# Patient Record
Sex: FEMALE | Race: WHITE | NOT HISPANIC OR LATINO | URBAN - METROPOLITAN AREA
[De-identification: names, ages, dates, MRNs, and addresses within clinical notes are randomized per-mention and may not be internally consistent; named-entity substitution may affect disease eponyms.]

---

## 2017-06-20 ENCOUNTER — OUTPATIENT (OUTPATIENT)
Dept: OUTPATIENT SERVICES | Facility: HOSPITAL | Age: 82
LOS: 1 days | Discharge: HOME | End: 2017-06-20

## 2017-06-20 DIAGNOSIS — R10.9 UNSPECIFIED ABDOMINAL PAIN: ICD-10-CM
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
## 2017-06-28 DIAGNOSIS — I48.2 CHRONIC ATRIAL FIBRILLATION: ICD-10-CM

## 2017-07-05 ENCOUNTER — OUTPATIENT (OUTPATIENT)
Dept: OUTPATIENT SERVICES | Facility: HOSPITAL | Age: 82
LOS: 1 days | Discharge: HOME | End: 2017-07-05
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
## 2017-07-05 DIAGNOSIS — R10.9 UNSPECIFIED ABDOMINAL PAIN: ICD-10-CM

## 2017-07-05 DIAGNOSIS — I48.2 CHRONIC ATRIAL FIBRILLATION: ICD-10-CM

## 2017-07-18 ENCOUNTER — OUTPATIENT (OUTPATIENT)
Dept: OUTPATIENT SERVICES | Facility: HOSPITAL | Age: 82
LOS: 1 days | Discharge: HOME | End: 2017-07-18
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
## 2017-07-18 DIAGNOSIS — I48.2 CHRONIC ATRIAL FIBRILLATION: ICD-10-CM

## 2017-07-18 DIAGNOSIS — R10.9 UNSPECIFIED ABDOMINAL PAIN: ICD-10-CM

## 2017-08-01 ENCOUNTER — OUTPATIENT (OUTPATIENT)
Dept: OUTPATIENT SERVICES | Facility: HOSPITAL | Age: 82
LOS: 1 days | Discharge: HOME | End: 2017-08-01
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
## 2017-08-01 DIAGNOSIS — R10.9 UNSPECIFIED ABDOMINAL PAIN: ICD-10-CM

## 2017-08-01 DIAGNOSIS — I48.2 CHRONIC ATRIAL FIBRILLATION: ICD-10-CM

## 2017-08-15 ENCOUNTER — OUTPATIENT (OUTPATIENT)
Dept: OUTPATIENT SERVICES | Facility: HOSPITAL | Age: 82
LOS: 1 days | Discharge: HOME | End: 2017-08-15

## 2017-08-15 DIAGNOSIS — R10.9 UNSPECIFIED ABDOMINAL PAIN: ICD-10-CM

## 2017-08-15 DIAGNOSIS — I48.2 CHRONIC ATRIAL FIBRILLATION: ICD-10-CM
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
## 2017-08-29 ENCOUNTER — OUTPATIENT (OUTPATIENT)
Dept: OUTPATIENT SERVICES | Facility: HOSPITAL | Age: 82
LOS: 1 days | Discharge: HOME | End: 2017-08-29
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
## 2017-08-29 DIAGNOSIS — R10.9 UNSPECIFIED ABDOMINAL PAIN: ICD-10-CM

## 2017-08-29 DIAGNOSIS — I48.2 CHRONIC ATRIAL FIBRILLATION: ICD-10-CM

## 2017-09-12 ENCOUNTER — OUTPATIENT (OUTPATIENT)
Dept: OUTPATIENT SERVICES | Facility: HOSPITAL | Age: 82
LOS: 1 days | Discharge: HOME | End: 2017-09-12

## 2017-09-12 DIAGNOSIS — R10.9 UNSPECIFIED ABDOMINAL PAIN: ICD-10-CM
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
## 2017-09-26 ENCOUNTER — OUTPATIENT (OUTPATIENT)
Dept: OUTPATIENT SERVICES | Facility: HOSPITAL | Age: 82
LOS: 1 days | Discharge: HOME | End: 2017-09-26
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
## 2017-09-26 DIAGNOSIS — R10.9 UNSPECIFIED ABDOMINAL PAIN: ICD-10-CM

## 2017-09-26 DIAGNOSIS — I48.2 CHRONIC ATRIAL FIBRILLATION: ICD-10-CM

## 2017-10-10 ENCOUNTER — OUTPATIENT (OUTPATIENT)
Dept: OUTPATIENT SERVICES | Facility: HOSPITAL | Age: 82
LOS: 1 days | Discharge: HOME | End: 2017-10-10

## 2017-10-10 DIAGNOSIS — R10.9 UNSPECIFIED ABDOMINAL PAIN: ICD-10-CM

## 2017-10-10 DIAGNOSIS — I48.2 CHRONIC ATRIAL FIBRILLATION: ICD-10-CM
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
## 2017-10-10 DIAGNOSIS — Z02.9 ENCOUNTER FOR ADMINISTRATIVE EXAMINATIONS, UNSPECIFIED: ICD-10-CM

## 2017-10-12 ENCOUNTER — OUTPATIENT (OUTPATIENT)
Dept: OUTPATIENT SERVICES | Facility: HOSPITAL | Age: 82
LOS: 1 days | Discharge: HOME | End: 2017-10-12

## 2017-10-12 DIAGNOSIS — R10.9 UNSPECIFIED ABDOMINAL PAIN: ICD-10-CM

## 2017-10-12 DIAGNOSIS — I48.2 CHRONIC ATRIAL FIBRILLATION: ICD-10-CM
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
## 2017-10-24 ENCOUNTER — OUTPATIENT (OUTPATIENT)
Dept: OUTPATIENT SERVICES | Facility: HOSPITAL | Age: 82
LOS: 1 days | Discharge: HOME | End: 2017-10-24

## 2017-10-24 DIAGNOSIS — I48.2 CHRONIC ATRIAL FIBRILLATION: ICD-10-CM

## 2017-10-24 DIAGNOSIS — R10.9 UNSPECIFIED ABDOMINAL PAIN: ICD-10-CM
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
## 2017-11-07 ENCOUNTER — OUTPATIENT (OUTPATIENT)
Dept: OUTPATIENT SERVICES | Facility: HOSPITAL | Age: 82
LOS: 1 days | Discharge: HOME | End: 2017-11-07
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
## 2017-11-07 DIAGNOSIS — I48.2 CHRONIC ATRIAL FIBRILLATION: ICD-10-CM

## 2017-11-07 DIAGNOSIS — R10.9 UNSPECIFIED ABDOMINAL PAIN: ICD-10-CM

## 2017-11-21 ENCOUNTER — OUTPATIENT (OUTPATIENT)
Dept: OUTPATIENT SERVICES | Facility: HOSPITAL | Age: 82
LOS: 1 days | Discharge: HOME | End: 2017-11-21
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
## 2017-11-21 DIAGNOSIS — R10.9 UNSPECIFIED ABDOMINAL PAIN: ICD-10-CM

## 2017-11-21 DIAGNOSIS — I48.2 CHRONIC ATRIAL FIBRILLATION: ICD-10-CM

## 2017-11-30 ENCOUNTER — EMERGENCY (EMERGENCY)
Facility: HOSPITAL | Age: 82
LOS: 0 days | Discharge: HOME | End: 2017-11-30

## 2017-11-30 DIAGNOSIS — J06.9 ACUTE UPPER RESPIRATORY INFECTION, UNSPECIFIED: ICD-10-CM

## 2017-11-30 DIAGNOSIS — R05 COUGH: ICD-10-CM

## 2017-11-30 DIAGNOSIS — R01.1 CARDIAC MURMUR, UNSPECIFIED: ICD-10-CM

## 2017-11-30 DIAGNOSIS — Z79.899 OTHER LONG TERM (CURRENT) DRUG THERAPY: ICD-10-CM

## 2017-11-30 DIAGNOSIS — K21.9 GASTRO-ESOPHAGEAL REFLUX DISEASE WITHOUT ESOPHAGITIS: ICD-10-CM

## 2017-11-30 DIAGNOSIS — Z95.0 PRESENCE OF CARDIAC PACEMAKER: ICD-10-CM

## 2017-11-30 DIAGNOSIS — Z79.01 LONG TERM (CURRENT) USE OF ANTICOAGULANTS: ICD-10-CM

## 2017-11-30 DIAGNOSIS — Z79.51 LONG TERM (CURRENT) USE OF INHALED STEROIDS: ICD-10-CM

## 2017-11-30 DIAGNOSIS — H10.9 UNSPECIFIED CONJUNCTIVITIS: ICD-10-CM

## 2017-11-30 DIAGNOSIS — R10.9 UNSPECIFIED ABDOMINAL PAIN: ICD-10-CM

## 2017-11-30 DIAGNOSIS — Z90.49 ACQUIRED ABSENCE OF OTHER SPECIFIED PARTS OF DIGESTIVE TRACT: ICD-10-CM

## 2017-11-30 DIAGNOSIS — J44.9 CHRONIC OBSTRUCTIVE PULMONARY DISEASE, UNSPECIFIED: ICD-10-CM

## 2017-11-30 DIAGNOSIS — I50.9 HEART FAILURE, UNSPECIFIED: ICD-10-CM
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
## 2017-12-05 ENCOUNTER — OUTPATIENT (OUTPATIENT)
Dept: OUTPATIENT SERVICES | Facility: HOSPITAL | Age: 82
LOS: 1 days | Discharge: HOME | End: 2017-12-05

## 2017-12-05 DIAGNOSIS — I48.2 CHRONIC ATRIAL FIBRILLATION: ICD-10-CM
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
## 2017-12-05 DIAGNOSIS — R10.9 UNSPECIFIED ABDOMINAL PAIN: ICD-10-CM

## 2017-12-19 ENCOUNTER — OUTPATIENT (OUTPATIENT)
Dept: OUTPATIENT SERVICES | Facility: HOSPITAL | Age: 82
LOS: 1 days | Discharge: HOME | End: 2017-12-19
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
## 2017-12-19 DIAGNOSIS — I48.2 CHRONIC ATRIAL FIBRILLATION: ICD-10-CM

## 2017-12-19 DIAGNOSIS — R10.9 UNSPECIFIED ABDOMINAL PAIN: ICD-10-CM

## 2017-12-27 ENCOUNTER — INPATIENT (INPATIENT)
Facility: HOSPITAL | Age: 82
LOS: 8 days | Discharge: NURSING, REHAB OR LONG-TERM CARE | End: 2018-01-05
Attending: SURGERY

## 2018-01-09 PROBLEM — Z00.00 ENCOUNTER FOR PREVENTIVE HEALTH EXAMINATION: Status: ACTIVE | Noted: 2018-01-09

## 2018-01-10 DIAGNOSIS — G89.11 ACUTE PAIN DUE TO TRAUMA: ICD-10-CM

## 2018-01-10 DIAGNOSIS — Z95.0 PRESENCE OF CARDIAC PACEMAKER: ICD-10-CM

## 2018-01-10 DIAGNOSIS — S27.329A CONTUSION OF LUNG, UNSPECIFIED, INITIAL ENCOUNTER: ICD-10-CM

## 2018-01-10 DIAGNOSIS — I50.32 CHRONIC DIASTOLIC (CONGESTIVE) HEART FAILURE: ICD-10-CM

## 2018-01-10 DIAGNOSIS — Z80.3 FAMILY HISTORY OF MALIGNANT NEOPLASM OF BREAST: ICD-10-CM

## 2018-01-10 DIAGNOSIS — I48.2 CHRONIC ATRIAL FIBRILLATION: ICD-10-CM

## 2018-01-10 DIAGNOSIS — I48.92 UNSPECIFIED ATRIAL FLUTTER: ICD-10-CM

## 2018-01-10 DIAGNOSIS — Z79.01 LONG TERM (CURRENT) USE OF ANTICOAGULANTS: ICD-10-CM
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
## 2018-01-10 DIAGNOSIS — S22.41XA MULTIPLE FRACTURES OF RIBS, RIGHT SIDE, INITIAL ENCOUNTER FOR CLOSED FRACTURE: ICD-10-CM

## 2018-01-10 DIAGNOSIS — I11.0 HYPERTENSIVE HEART DISEASE WITH HEART FAILURE: ICD-10-CM
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
## 2018-01-10 DIAGNOSIS — E83.39 OTHER DISORDERS OF PHOSPHORUS METABOLISM: ICD-10-CM

## 2018-01-10 DIAGNOSIS — I08.3 COMBINED RHEUMATIC DISORDERS OF MITRAL, AORTIC AND TRICUSPID VALVES: ICD-10-CM
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
## 2018-01-10 DIAGNOSIS — E83.42 HYPOMAGNESEMIA: ICD-10-CM

## 2018-01-10 DIAGNOSIS — I27.20 PULMONARY HYPERTENSION, UNSPECIFIED: ICD-10-CM

## 2018-01-10 DIAGNOSIS — H40.9 UNSPECIFIED GLAUCOMA: ICD-10-CM

## 2018-01-10 DIAGNOSIS — K27.9 PEPTIC ULCER, SITE UNSPECIFIED, UNSPECIFIED AS ACUTE OR CHRONIC, WITHOUT HEMORRHAGE OR PERFORATION: ICD-10-CM

## 2018-01-11 DIAGNOSIS — R64 CACHEXIA: ICD-10-CM

## 2018-01-23 ENCOUNTER — INPATIENT (INPATIENT)
Facility: HOSPITAL | Age: 83
LOS: 5 days | Discharge: NURSING, REHAB OR LONG-TERM CARE | End: 2018-01-29
Attending: INTERNAL MEDICINE | Admitting: INTERNAL MEDICINE

## 2018-01-25 ENCOUNTER — APPOINTMENT (OUTPATIENT)
Dept: SURGERY | Facility: CLINIC | Age: 83
End: 2018-01-25

## 2018-02-04 DIAGNOSIS — R07.9 CHEST PAIN, UNSPECIFIED: ICD-10-CM
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
## 2018-02-04 DIAGNOSIS — D50.0 IRON DEFICIENCY ANEMIA SECONDARY TO BLOOD LOSS (CHRONIC): ICD-10-CM

## 2018-02-07 DIAGNOSIS — N18.3 CHRONIC KIDNEY DISEASE, STAGE 3 (MODERATE): ICD-10-CM

## 2018-02-07 DIAGNOSIS — Z86.018 PERSONAL HISTORY OF OTHER BENIGN NEOPLASM: ICD-10-CM

## 2018-02-07 DIAGNOSIS — K21.9 GASTRO-ESOPHAGEAL REFLUX DISEASE WITHOUT ESOPHAGITIS: ICD-10-CM

## 2018-02-07 DIAGNOSIS — I13.0 HYPERTENSIVE HEART AND CHRONIC KIDNEY DISEASE WITH HEART FAILURE AND STAGE 1 THROUGH STAGE 4 CHRONIC KIDNEY DISEASE, OR UNSPECIFIED CHRONIC KIDNEY DISEASE: ICD-10-CM

## 2018-02-07 DIAGNOSIS — J40 BRONCHITIS, NOT SPECIFIED AS ACUTE OR CHRONIC: ICD-10-CM

## 2018-02-07 DIAGNOSIS — K44.9 DIAPHRAGMATIC HERNIA WITHOUT OBSTRUCTION OR GANGRENE: ICD-10-CM

## 2018-02-07 DIAGNOSIS — N39.0 URINARY TRACT INFECTION, SITE NOT SPECIFIED: ICD-10-CM

## 2018-02-07 DIAGNOSIS — R07.9 CHEST PAIN, UNSPECIFIED: ICD-10-CM

## 2018-02-07 DIAGNOSIS — I27.20 PULMONARY HYPERTENSION, UNSPECIFIED: ICD-10-CM

## 2018-02-07 DIAGNOSIS — E87.5 HYPERKALEMIA: ICD-10-CM

## 2018-02-07 DIAGNOSIS — Z79.01 LONG TERM (CURRENT) USE OF ANTICOAGULANTS: ICD-10-CM

## 2018-02-07 DIAGNOSIS — J44.9 CHRONIC OBSTRUCTIVE PULMONARY DISEASE, UNSPECIFIED: ICD-10-CM

## 2018-02-07 DIAGNOSIS — Z95.0 PRESENCE OF CARDIAC PACEMAKER: ICD-10-CM

## 2018-02-07 DIAGNOSIS — I95.9 HYPOTENSION, UNSPECIFIED: ICD-10-CM

## 2018-02-07 DIAGNOSIS — D64.9 ANEMIA, UNSPECIFIED: ICD-10-CM

## 2018-02-07 DIAGNOSIS — F03.90 UNSPECIFIED DEMENTIA WITHOUT BEHAVIORAL DISTURBANCE: ICD-10-CM

## 2018-02-07 DIAGNOSIS — S22.41XS MULTIPLE FRACTURES OF RIBS, RIGHT SIDE, SEQUELA: ICD-10-CM

## 2018-02-07 DIAGNOSIS — I50.9 HEART FAILURE, UNSPECIFIED: ICD-10-CM

## 2018-02-07 DIAGNOSIS — I48.2 CHRONIC ATRIAL FIBRILLATION: ICD-10-CM

## 2018-02-07 DIAGNOSIS — R07.89 OTHER CHEST PAIN: ICD-10-CM
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
## 2019-03-26 ENCOUNTER — OUTPATIENT (OUTPATIENT)
Dept: OUTPATIENT SERVICES | Facility: HOSPITAL | Age: 84
LOS: 1 days | Discharge: HOME | End: 2019-03-26

## 2019-03-26 DIAGNOSIS — I48.91 UNSPECIFIED ATRIAL FIBRILLATION: ICD-10-CM

## 2019-04-02 ENCOUNTER — OUTPATIENT (OUTPATIENT)
Dept: OUTPATIENT SERVICES | Facility: HOSPITAL | Age: 84
LOS: 1 days | Discharge: HOME | End: 2019-04-02

## 2019-04-03 DIAGNOSIS — Z79.899 OTHER LONG TERM (CURRENT) DRUG THERAPY: ICD-10-CM

## 2019-04-09 ENCOUNTER — OUTPATIENT (OUTPATIENT)
Dept: OUTPATIENT SERVICES | Facility: HOSPITAL | Age: 84
LOS: 1 days | Discharge: HOME | End: 2019-04-09

## 2019-04-10 DIAGNOSIS — I48.91 UNSPECIFIED ATRIAL FIBRILLATION: ICD-10-CM

## 2019-04-16 ENCOUNTER — OUTPATIENT (OUTPATIENT)
Dept: OUTPATIENT SERVICES | Facility: HOSPITAL | Age: 84
LOS: 1 days | Discharge: HOME | End: 2019-04-16

## 2019-04-16 DIAGNOSIS — I48.91 UNSPECIFIED ATRIAL FIBRILLATION: ICD-10-CM

## 2019-04-23 ENCOUNTER — OUTPATIENT (OUTPATIENT)
Dept: OUTPATIENT SERVICES | Facility: HOSPITAL | Age: 84
LOS: 1 days | Discharge: HOME | End: 2019-04-23

## 2019-04-23 DIAGNOSIS — I48.91 UNSPECIFIED ATRIAL FIBRILLATION: ICD-10-CM

## 2019-04-30 ENCOUNTER — OUTPATIENT (OUTPATIENT)
Dept: OUTPATIENT SERVICES | Facility: HOSPITAL | Age: 84
LOS: 1 days | Discharge: HOME | End: 2019-04-30

## 2019-04-30 DIAGNOSIS — I48.91 UNSPECIFIED ATRIAL FIBRILLATION: ICD-10-CM

## 2019-05-07 ENCOUNTER — OUTPATIENT (OUTPATIENT)
Dept: OUTPATIENT SERVICES | Facility: HOSPITAL | Age: 84
LOS: 1 days | Discharge: HOME | End: 2019-05-07

## 2019-05-07 DIAGNOSIS — I48.91 UNSPECIFIED ATRIAL FIBRILLATION: ICD-10-CM

## 2019-05-14 ENCOUNTER — OUTPATIENT (OUTPATIENT)
Dept: OUTPATIENT SERVICES | Facility: HOSPITAL | Age: 84
LOS: 1 days | Discharge: HOME | End: 2019-05-14

## 2019-05-14 DIAGNOSIS — Z79.899 OTHER LONG TERM (CURRENT) DRUG THERAPY: ICD-10-CM

## 2019-05-21 ENCOUNTER — OUTPATIENT (OUTPATIENT)
Dept: OUTPATIENT SERVICES | Facility: HOSPITAL | Age: 84
LOS: 1 days | Discharge: HOME | End: 2019-05-21

## 2019-05-22 DIAGNOSIS — I48.91 UNSPECIFIED ATRIAL FIBRILLATION: ICD-10-CM

## 2019-05-28 ENCOUNTER — OUTPATIENT (OUTPATIENT)
Dept: OUTPATIENT SERVICES | Facility: HOSPITAL | Age: 84
LOS: 1 days | Discharge: HOME | End: 2019-05-28

## 2019-05-29 ENCOUNTER — OUTPATIENT (OUTPATIENT)
Dept: OUTPATIENT SERVICES | Facility: HOSPITAL | Age: 84
LOS: 1 days | Discharge: HOME | End: 2019-05-29

## 2019-05-29 DIAGNOSIS — I48.91 UNSPECIFIED ATRIAL FIBRILLATION: ICD-10-CM

## 2019-05-29 DIAGNOSIS — Z79.899 OTHER LONG TERM (CURRENT) DRUG THERAPY: ICD-10-CM

## 2019-05-30 ENCOUNTER — OUTPATIENT (OUTPATIENT)
Dept: OUTPATIENT SERVICES | Facility: HOSPITAL | Age: 84
LOS: 1 days | Discharge: HOME | End: 2019-05-30

## 2019-05-30 DIAGNOSIS — I48.91 UNSPECIFIED ATRIAL FIBRILLATION: ICD-10-CM

## 2019-06-04 ENCOUNTER — OUTPATIENT (OUTPATIENT)
Dept: OUTPATIENT SERVICES | Facility: HOSPITAL | Age: 84
LOS: 1 days | Discharge: HOME | End: 2019-06-04

## 2019-06-04 DIAGNOSIS — I48.91 UNSPECIFIED ATRIAL FIBRILLATION: ICD-10-CM

## 2019-06-11 ENCOUNTER — OUTPATIENT (OUTPATIENT)
Dept: OUTPATIENT SERVICES | Facility: HOSPITAL | Age: 84
LOS: 1 days | Discharge: HOME | End: 2019-06-11

## 2019-06-11 DIAGNOSIS — I48.91 UNSPECIFIED ATRIAL FIBRILLATION: ICD-10-CM

## 2019-06-12 ENCOUNTER — OUTPATIENT (OUTPATIENT)
Dept: OUTPATIENT SERVICES | Facility: HOSPITAL | Age: 84
LOS: 1 days | Discharge: HOME | End: 2019-06-12

## 2019-06-12 DIAGNOSIS — I48.91 UNSPECIFIED ATRIAL FIBRILLATION: ICD-10-CM

## 2019-06-13 ENCOUNTER — OUTPATIENT (OUTPATIENT)
Dept: OUTPATIENT SERVICES | Facility: HOSPITAL | Age: 84
LOS: 1 days | Discharge: HOME | End: 2019-06-13

## 2019-06-13 DIAGNOSIS — I48.91 UNSPECIFIED ATRIAL FIBRILLATION: ICD-10-CM

## 2019-06-18 ENCOUNTER — OUTPATIENT (OUTPATIENT)
Dept: OUTPATIENT SERVICES | Facility: HOSPITAL | Age: 84
LOS: 1 days | Discharge: HOME | End: 2019-06-18

## 2019-06-18 DIAGNOSIS — I48.91 UNSPECIFIED ATRIAL FIBRILLATION: ICD-10-CM

## 2019-06-25 ENCOUNTER — OUTPATIENT (OUTPATIENT)
Dept: OUTPATIENT SERVICES | Facility: HOSPITAL | Age: 84
LOS: 1 days | Discharge: HOME | End: 2019-06-25

## 2019-06-25 DIAGNOSIS — I48.91 UNSPECIFIED ATRIAL FIBRILLATION: ICD-10-CM

## 2019-07-02 ENCOUNTER — OUTPATIENT (OUTPATIENT)
Dept: OUTPATIENT SERVICES | Facility: HOSPITAL | Age: 84
LOS: 1 days | Discharge: HOME | End: 2019-07-02

## 2019-07-02 DIAGNOSIS — I48.91 UNSPECIFIED ATRIAL FIBRILLATION: ICD-10-CM

## 2019-07-09 ENCOUNTER — OUTPATIENT (OUTPATIENT)
Dept: OUTPATIENT SERVICES | Facility: HOSPITAL | Age: 84
LOS: 1 days | Discharge: HOME | End: 2019-07-09

## 2019-07-09 DIAGNOSIS — Z79.899 OTHER LONG TERM (CURRENT) DRUG THERAPY: ICD-10-CM

## 2019-07-16 ENCOUNTER — OUTPATIENT (OUTPATIENT)
Dept: OUTPATIENT SERVICES | Facility: HOSPITAL | Age: 84
LOS: 1 days | Discharge: HOME | End: 2019-07-16

## 2019-07-16 DIAGNOSIS — I48.91 UNSPECIFIED ATRIAL FIBRILLATION: ICD-10-CM

## 2019-07-23 ENCOUNTER — OUTPATIENT (OUTPATIENT)
Dept: OUTPATIENT SERVICES | Facility: HOSPITAL | Age: 84
LOS: 1 days | Discharge: HOME | End: 2019-07-23

## 2019-07-23 DIAGNOSIS — I48.91 UNSPECIFIED ATRIAL FIBRILLATION: ICD-10-CM

## 2019-07-30 ENCOUNTER — OUTPATIENT (OUTPATIENT)
Dept: OUTPATIENT SERVICES | Facility: HOSPITAL | Age: 84
LOS: 1 days | Discharge: HOME | End: 2019-07-30

## 2019-07-30 DIAGNOSIS — I48.91 UNSPECIFIED ATRIAL FIBRILLATION: ICD-10-CM

## 2019-08-06 ENCOUNTER — OUTPATIENT (OUTPATIENT)
Dept: OUTPATIENT SERVICES | Facility: HOSPITAL | Age: 84
LOS: 1 days | Discharge: HOME | End: 2019-08-06

## 2019-08-06 DIAGNOSIS — I48.91 UNSPECIFIED ATRIAL FIBRILLATION: ICD-10-CM

## 2019-08-13 ENCOUNTER — OUTPATIENT (OUTPATIENT)
Dept: OUTPATIENT SERVICES | Facility: HOSPITAL | Age: 84
LOS: 1 days | Discharge: HOME | End: 2019-08-13

## 2019-08-15 DIAGNOSIS — I48.91 UNSPECIFIED ATRIAL FIBRILLATION: ICD-10-CM

## 2019-08-20 ENCOUNTER — OUTPATIENT (OUTPATIENT)
Dept: OUTPATIENT SERVICES | Facility: HOSPITAL | Age: 84
LOS: 1 days | Discharge: HOME | End: 2019-08-20

## 2019-08-20 DIAGNOSIS — I48.91 UNSPECIFIED ATRIAL FIBRILLATION: ICD-10-CM

## 2019-08-27 ENCOUNTER — OUTPATIENT (OUTPATIENT)
Dept: OUTPATIENT SERVICES | Facility: HOSPITAL | Age: 84
LOS: 1 days | Discharge: HOME | End: 2019-08-27

## 2019-08-27 DIAGNOSIS — I48.91 UNSPECIFIED ATRIAL FIBRILLATION: ICD-10-CM

## 2019-09-03 ENCOUNTER — OUTPATIENT (OUTPATIENT)
Dept: OUTPATIENT SERVICES | Facility: HOSPITAL | Age: 84
LOS: 1 days | Discharge: HOME | End: 2019-09-03

## 2019-09-03 DIAGNOSIS — I48.91 UNSPECIFIED ATRIAL FIBRILLATION: ICD-10-CM

## 2019-09-10 ENCOUNTER — OUTPATIENT (OUTPATIENT)
Dept: OUTPATIENT SERVICES | Facility: HOSPITAL | Age: 84
LOS: 1 days | Discharge: HOME | End: 2019-09-10

## 2019-09-10 DIAGNOSIS — I48.91 UNSPECIFIED ATRIAL FIBRILLATION: ICD-10-CM

## 2019-09-11 ENCOUNTER — OUTPATIENT (OUTPATIENT)
Dept: OUTPATIENT SERVICES | Facility: HOSPITAL | Age: 84
LOS: 1 days | Discharge: HOME | End: 2019-09-11

## 2019-09-11 DIAGNOSIS — I48.91 UNSPECIFIED ATRIAL FIBRILLATION: ICD-10-CM

## 2019-09-17 ENCOUNTER — OUTPATIENT (OUTPATIENT)
Dept: OUTPATIENT SERVICES | Facility: HOSPITAL | Age: 84
LOS: 1 days | Discharge: HOME | End: 2019-09-17

## 2019-09-17 DIAGNOSIS — I48.91 UNSPECIFIED ATRIAL FIBRILLATION: ICD-10-CM

## 2019-09-24 ENCOUNTER — OUTPATIENT (OUTPATIENT)
Dept: OUTPATIENT SERVICES | Facility: HOSPITAL | Age: 84
LOS: 1 days | Discharge: HOME | End: 2019-09-24

## 2019-09-24 DIAGNOSIS — I48.91 UNSPECIFIED ATRIAL FIBRILLATION: ICD-10-CM

## 2019-10-01 ENCOUNTER — OUTPATIENT (OUTPATIENT)
Dept: OUTPATIENT SERVICES | Facility: HOSPITAL | Age: 84
LOS: 1 days | Discharge: HOME | End: 2019-10-01

## 2019-10-01 DIAGNOSIS — I48.91 UNSPECIFIED ATRIAL FIBRILLATION: ICD-10-CM

## 2019-10-07 ENCOUNTER — OUTPATIENT (OUTPATIENT)
Dept: OUTPATIENT SERVICES | Facility: HOSPITAL | Age: 84
LOS: 1 days | Discharge: HOME | End: 2019-10-07

## 2019-10-08 ENCOUNTER — OUTPATIENT (OUTPATIENT)
Dept: OUTPATIENT SERVICES | Facility: HOSPITAL | Age: 84
LOS: 1 days | Discharge: HOME | End: 2019-10-08

## 2019-10-08 DIAGNOSIS — I48.91 UNSPECIFIED ATRIAL FIBRILLATION: ICD-10-CM

## 2019-10-08 DIAGNOSIS — D50.9 IRON DEFICIENCY ANEMIA, UNSPECIFIED: ICD-10-CM

## 2019-10-10 ENCOUNTER — OUTPATIENT (OUTPATIENT)
Dept: OUTPATIENT SERVICES | Facility: HOSPITAL | Age: 84
LOS: 1 days | Discharge: HOME | End: 2019-10-10

## 2019-10-10 DIAGNOSIS — E87.6 HYPOKALEMIA: ICD-10-CM

## 2019-10-14 ENCOUNTER — OUTPATIENT (OUTPATIENT)
Dept: OUTPATIENT SERVICES | Facility: HOSPITAL | Age: 84
LOS: 1 days | Discharge: HOME | End: 2019-10-14

## 2019-10-14 DIAGNOSIS — D64.9 ANEMIA, UNSPECIFIED: ICD-10-CM

## 2019-10-14 DIAGNOSIS — R79.9 ABNORMAL FINDING OF BLOOD CHEMISTRY, UNSPECIFIED: ICD-10-CM

## 2019-10-15 ENCOUNTER — OUTPATIENT (OUTPATIENT)
Dept: OUTPATIENT SERVICES | Facility: HOSPITAL | Age: 84
LOS: 1 days | Discharge: HOME | End: 2019-10-15

## 2019-10-15 DIAGNOSIS — I48.91 UNSPECIFIED ATRIAL FIBRILLATION: ICD-10-CM

## 2019-10-22 ENCOUNTER — OUTPATIENT (OUTPATIENT)
Dept: OUTPATIENT SERVICES | Facility: HOSPITAL | Age: 84
LOS: 1 days | Discharge: HOME | End: 2019-10-22

## 2019-10-22 DIAGNOSIS — I48.91 UNSPECIFIED ATRIAL FIBRILLATION: ICD-10-CM

## 2019-10-29 ENCOUNTER — OUTPATIENT (OUTPATIENT)
Dept: OUTPATIENT SERVICES | Facility: HOSPITAL | Age: 84
LOS: 1 days | Discharge: HOME | End: 2019-10-29

## 2019-10-29 DIAGNOSIS — I48.91 UNSPECIFIED ATRIAL FIBRILLATION: ICD-10-CM

## 2019-10-30 ENCOUNTER — OUTPATIENT (OUTPATIENT)
Dept: OUTPATIENT SERVICES | Facility: HOSPITAL | Age: 84
LOS: 1 days | Discharge: HOME | End: 2019-10-30

## 2019-10-30 DIAGNOSIS — I48.91 UNSPECIFIED ATRIAL FIBRILLATION: ICD-10-CM

## 2019-11-01 ENCOUNTER — OUTPATIENT (OUTPATIENT)
Dept: OUTPATIENT SERVICES | Facility: HOSPITAL | Age: 84
LOS: 1 days | Discharge: HOME | End: 2019-11-01

## 2019-11-01 DIAGNOSIS — I48.91 UNSPECIFIED ATRIAL FIBRILLATION: ICD-10-CM

## 2019-11-01 DIAGNOSIS — R30.0 DYSURIA: ICD-10-CM

## 2019-11-05 ENCOUNTER — OUTPATIENT (OUTPATIENT)
Dept: OUTPATIENT SERVICES | Facility: HOSPITAL | Age: 84
LOS: 1 days | Discharge: HOME | End: 2019-11-05

## 2019-11-05 DIAGNOSIS — I48.91 UNSPECIFIED ATRIAL FIBRILLATION: ICD-10-CM

## 2019-11-07 ENCOUNTER — OUTPATIENT (OUTPATIENT)
Dept: OUTPATIENT SERVICES | Facility: HOSPITAL | Age: 84
LOS: 1 days | Discharge: HOME | End: 2019-11-07

## 2019-11-08 DIAGNOSIS — Z79.899 OTHER LONG TERM (CURRENT) DRUG THERAPY: ICD-10-CM

## 2019-11-09 ENCOUNTER — OUTPATIENT (OUTPATIENT)
Dept: OUTPATIENT SERVICES | Facility: HOSPITAL | Age: 84
LOS: 1 days | Discharge: HOME | End: 2019-11-09

## 2019-11-09 DIAGNOSIS — I48.91 UNSPECIFIED ATRIAL FIBRILLATION: ICD-10-CM

## 2019-11-11 ENCOUNTER — OUTPATIENT (OUTPATIENT)
Dept: OUTPATIENT SERVICES | Facility: HOSPITAL | Age: 84
LOS: 1 days | Discharge: HOME | End: 2019-11-11

## 2019-11-11 DIAGNOSIS — I48.91 UNSPECIFIED ATRIAL FIBRILLATION: ICD-10-CM

## 2019-11-12 ENCOUNTER — OUTPATIENT (OUTPATIENT)
Dept: OUTPATIENT SERVICES | Facility: HOSPITAL | Age: 84
LOS: 1 days | Discharge: HOME | End: 2019-11-12

## 2019-11-12 DIAGNOSIS — I48.91 UNSPECIFIED ATRIAL FIBRILLATION: ICD-10-CM

## 2019-11-19 ENCOUNTER — OUTPATIENT (OUTPATIENT)
Dept: OUTPATIENT SERVICES | Facility: HOSPITAL | Age: 84
LOS: 1 days | Discharge: HOME | End: 2019-11-19

## 2019-11-19 DIAGNOSIS — I48.91 UNSPECIFIED ATRIAL FIBRILLATION: ICD-10-CM

## 2019-11-22 ENCOUNTER — OUTPATIENT (OUTPATIENT)
Dept: OUTPATIENT SERVICES | Facility: HOSPITAL | Age: 84
LOS: 1 days | Discharge: HOME | End: 2019-11-22

## 2019-11-22 DIAGNOSIS — I48.91 UNSPECIFIED ATRIAL FIBRILLATION: ICD-10-CM

## 2019-11-26 ENCOUNTER — OUTPATIENT (OUTPATIENT)
Dept: OUTPATIENT SERVICES | Facility: HOSPITAL | Age: 84
LOS: 1 days | Discharge: HOME | End: 2019-11-26

## 2019-11-26 DIAGNOSIS — I48.91 UNSPECIFIED ATRIAL FIBRILLATION: ICD-10-CM

## 2019-11-27 ENCOUNTER — OUTPATIENT (OUTPATIENT)
Dept: OUTPATIENT SERVICES | Facility: HOSPITAL | Age: 84
LOS: 1 days | Discharge: HOME | End: 2019-11-27

## 2019-11-27 DIAGNOSIS — I48.91 UNSPECIFIED ATRIAL FIBRILLATION: ICD-10-CM

## 2019-11-28 ENCOUNTER — OUTPATIENT (OUTPATIENT)
Dept: OUTPATIENT SERVICES | Facility: HOSPITAL | Age: 84
LOS: 1 days | Discharge: HOME | End: 2019-11-28

## 2019-11-28 DIAGNOSIS — R19.7 DIARRHEA, UNSPECIFIED: ICD-10-CM

## 2019-11-29 ENCOUNTER — OUTPATIENT (OUTPATIENT)
Dept: OUTPATIENT SERVICES | Facility: HOSPITAL | Age: 84
LOS: 1 days | Discharge: HOME | End: 2019-11-29

## 2019-11-29 DIAGNOSIS — I48.91 UNSPECIFIED ATRIAL FIBRILLATION: ICD-10-CM

## 2019-12-02 ENCOUNTER — OUTPATIENT (OUTPATIENT)
Dept: OUTPATIENT SERVICES | Facility: HOSPITAL | Age: 84
LOS: 1 days | Discharge: HOME | End: 2019-12-02

## 2019-12-02 DIAGNOSIS — I48.91 UNSPECIFIED ATRIAL FIBRILLATION: ICD-10-CM

## 2019-12-02 DIAGNOSIS — R30.0 DYSURIA: ICD-10-CM

## 2019-12-03 ENCOUNTER — OUTPATIENT (OUTPATIENT)
Dept: OUTPATIENT SERVICES | Facility: HOSPITAL | Age: 84
LOS: 1 days | Discharge: HOME | End: 2019-12-03

## 2019-12-03 DIAGNOSIS — I48.91 UNSPECIFIED ATRIAL FIBRILLATION: ICD-10-CM

## 2019-12-10 ENCOUNTER — OUTPATIENT (OUTPATIENT)
Dept: OUTPATIENT SERVICES | Facility: HOSPITAL | Age: 84
LOS: 1 days | Discharge: HOME | End: 2019-12-10

## 2019-12-10 DIAGNOSIS — I48.91 UNSPECIFIED ATRIAL FIBRILLATION: ICD-10-CM

## 2019-12-17 ENCOUNTER — OUTPATIENT (OUTPATIENT)
Dept: OUTPATIENT SERVICES | Facility: HOSPITAL | Age: 84
LOS: 1 days | Discharge: HOME | End: 2019-12-17

## 2019-12-17 DIAGNOSIS — I48.91 UNSPECIFIED ATRIAL FIBRILLATION: ICD-10-CM

## 2019-12-18 ENCOUNTER — OUTPATIENT (OUTPATIENT)
Dept: OUTPATIENT SERVICES | Facility: HOSPITAL | Age: 84
LOS: 1 days | Discharge: HOME | End: 2019-12-18

## 2019-12-18 DIAGNOSIS — I48.91 UNSPECIFIED ATRIAL FIBRILLATION: ICD-10-CM

## 2019-12-20 ENCOUNTER — OUTPATIENT (OUTPATIENT)
Dept: OUTPATIENT SERVICES | Facility: HOSPITAL | Age: 84
LOS: 1 days | Discharge: HOME | End: 2019-12-20

## 2019-12-20 DIAGNOSIS — I48.91 UNSPECIFIED ATRIAL FIBRILLATION: ICD-10-CM

## 2019-12-23 ENCOUNTER — OUTPATIENT (OUTPATIENT)
Dept: OUTPATIENT SERVICES | Facility: HOSPITAL | Age: 84
LOS: 1 days | Discharge: HOME | End: 2019-12-23

## 2019-12-23 DIAGNOSIS — I95.9 HYPOTENSION, UNSPECIFIED: ICD-10-CM

## 2019-12-24 ENCOUNTER — OUTPATIENT (OUTPATIENT)
Dept: OUTPATIENT SERVICES | Facility: HOSPITAL | Age: 84
LOS: 1 days | Discharge: HOME | End: 2019-12-24

## 2019-12-24 DIAGNOSIS — I48.91 UNSPECIFIED ATRIAL FIBRILLATION: ICD-10-CM

## 2019-12-24 DIAGNOSIS — D52.8 OTHER FOLATE DEFICIENCY ANEMIAS: ICD-10-CM

## 2019-12-27 ENCOUNTER — OUTPATIENT (OUTPATIENT)
Dept: OUTPATIENT SERVICES | Facility: HOSPITAL | Age: 84
LOS: 1 days | Discharge: HOME | End: 2019-12-27

## 2019-12-27 DIAGNOSIS — I48.91 UNSPECIFIED ATRIAL FIBRILLATION: ICD-10-CM

## 2019-12-28 ENCOUNTER — OUTPATIENT (OUTPATIENT)
Dept: OUTPATIENT SERVICES | Facility: HOSPITAL | Age: 84
LOS: 1 days | Discharge: HOME | End: 2019-12-28

## 2019-12-28 DIAGNOSIS — I48.91 UNSPECIFIED ATRIAL FIBRILLATION: ICD-10-CM

## 2019-12-31 ENCOUNTER — OUTPATIENT (OUTPATIENT)
Dept: OUTPATIENT SERVICES | Facility: HOSPITAL | Age: 84
LOS: 1 days | Discharge: HOME | End: 2019-12-31

## 2019-12-31 DIAGNOSIS — I48.91 UNSPECIFIED ATRIAL FIBRILLATION: ICD-10-CM

## 2020-10-10 ENCOUNTER — EMERGENCY (EMERGENCY)
Facility: HOSPITAL | Age: 85
LOS: 0 days | Discharge: NURSING, REHAB OR LONG-TERM CARE | End: 2020-10-11
Attending: EMERGENCY MEDICINE | Admitting: EMERGENCY MEDICINE
Payer: MEDICARE

## 2020-10-10 VITALS — OXYGEN SATURATION: 99 % | RESPIRATION RATE: 18 BRPM | HEART RATE: 60 BPM

## 2020-10-10 VITALS
SYSTOLIC BLOOD PRESSURE: 140 MMHG | DIASTOLIC BLOOD PRESSURE: 65 MMHG | HEART RATE: 60 BPM | RESPIRATION RATE: 16 BRPM | OXYGEN SATURATION: 100 % | WEIGHT: 78.48 LBS | TEMPERATURE: 98 F

## 2020-10-10 DIAGNOSIS — F03.90 UNSPECIFIED DEMENTIA WITHOUT BEHAVIORAL DISTURBANCE: ICD-10-CM

## 2020-10-10 DIAGNOSIS — S00.83XA CONTUSION OF OTHER PART OF HEAD, INITIAL ENCOUNTER: ICD-10-CM

## 2020-10-10 DIAGNOSIS — S09.90XA UNSPECIFIED INJURY OF HEAD, INITIAL ENCOUNTER: ICD-10-CM
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
## 2020-10-10 DIAGNOSIS — M25.511 PAIN IN RIGHT SHOULDER: ICD-10-CM
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
## 2020-10-10 DIAGNOSIS — J90 PLEURAL EFFUSION, NOT ELSEWHERE CLASSIFIED: ICD-10-CM

## 2020-10-10 DIAGNOSIS — I11.0 HYPERTENSIVE HEART DISEASE WITH HEART FAILURE: ICD-10-CM

## 2020-10-10 DIAGNOSIS — I48.91 UNSPECIFIED ATRIAL FIBRILLATION: ICD-10-CM

## 2020-10-10 DIAGNOSIS — K21.9 GASTRO-ESOPHAGEAL REFLUX DISEASE WITHOUT ESOPHAGITIS: ICD-10-CM

## 2020-10-10 DIAGNOSIS — I50.9 HEART FAILURE, UNSPECIFIED: ICD-10-CM

## 2020-10-10 LAB
ALBUMIN SERPL ELPH-MCNC: 3.7 G/DL — SIGNIFICANT CHANGE UP (ref 3.5–5.2)
ALP SERPL-CCNC: 47 U/L — SIGNIFICANT CHANGE UP (ref 30–115)
ALT FLD-CCNC: 8 U/L — SIGNIFICANT CHANGE UP (ref 0–41)
ANION GAP SERPL CALC-SCNC: 9 MMOL/L — SIGNIFICANT CHANGE UP (ref 7–14)
APTT BLD: 32.4 SEC — SIGNIFICANT CHANGE UP (ref 27–39.2)
AST SERPL-CCNC: 25 U/L — SIGNIFICANT CHANGE UP (ref 0–41)
BASOPHILS # BLD AUTO: 0.02 K/UL — SIGNIFICANT CHANGE UP (ref 0–0.2)
BASOPHILS NFR BLD AUTO: 0.3 % — SIGNIFICANT CHANGE UP (ref 0–1)
BILIRUB SERPL-MCNC: 0.2 MG/DL — SIGNIFICANT CHANGE UP (ref 0.2–1.2)
BLD GP AB SCN SERPL QL: SIGNIFICANT CHANGE UP
BUN SERPL-MCNC: 21 MG/DL — HIGH (ref 10–20)
CALCIUM SERPL-MCNC: 9.4 MG/DL — SIGNIFICANT CHANGE UP (ref 8.5–10.1)
CHLORIDE SERPL-SCNC: 99 MMOL/L — SIGNIFICANT CHANGE UP (ref 98–110)
CK SERPL-CCNC: 54 U/L — SIGNIFICANT CHANGE UP (ref 0–225)
CO2 SERPL-SCNC: 35 MMOL/L — HIGH (ref 17–32)
CREAT SERPL-MCNC: 0.7 MG/DL — SIGNIFICANT CHANGE UP (ref 0.7–1.5)
EOSINOPHIL # BLD AUTO: 0.05 K/UL — SIGNIFICANT CHANGE UP (ref 0–0.7)
EOSINOPHIL NFR BLD AUTO: 0.8 % — SIGNIFICANT CHANGE UP (ref 0–8)
GLUCOSE SERPL-MCNC: 112 MG/DL — HIGH (ref 70–99)
HCT VFR BLD CALC: 32.6 % — LOW (ref 37–47)
HGB BLD-MCNC: 10 G/DL — LOW (ref 12–16)
IMM GRANULOCYTES NFR BLD AUTO: 0.2 % — SIGNIFICANT CHANGE UP (ref 0.1–0.3)
INR BLD: 2.23 RATIO — HIGH (ref 0.65–1.3)
LIDOCAIN IGE QN: 37 U/L — SIGNIFICANT CHANGE UP (ref 7–60)
LYMPHOCYTES # BLD AUTO: 1.25 K/UL — SIGNIFICANT CHANGE UP (ref 1.2–3.4)
LYMPHOCYTES # BLD AUTO: 21.2 % — SIGNIFICANT CHANGE UP (ref 20.5–51.1)
MCHC RBC-ENTMCNC: 30.5 PG — SIGNIFICANT CHANGE UP (ref 27–31)
MCHC RBC-ENTMCNC: 30.7 G/DL — LOW (ref 32–37)
MCV RBC AUTO: 99.4 FL — HIGH (ref 81–99)
MONOCYTES # BLD AUTO: 0.42 K/UL — SIGNIFICANT CHANGE UP (ref 0.1–0.6)
MONOCYTES NFR BLD AUTO: 7.1 % — SIGNIFICANT CHANGE UP (ref 1.7–9.3)
NEUTROPHILS # BLD AUTO: 4.15 K/UL — SIGNIFICANT CHANGE UP (ref 1.4–6.5)
NEUTROPHILS NFR BLD AUTO: 70.4 % — SIGNIFICANT CHANGE UP (ref 42.2–75.2)
NRBC # BLD: 0 /100 WBCS — SIGNIFICANT CHANGE UP (ref 0–0)
PLATELET # BLD AUTO: 187 K/UL — SIGNIFICANT CHANGE UP (ref 130–400)
POTASSIUM SERPL-MCNC: 3.8 MMOL/L — SIGNIFICANT CHANGE UP (ref 3.5–5)
POTASSIUM SERPL-SCNC: 3.8 MMOL/L — SIGNIFICANT CHANGE UP (ref 3.5–5)
PROT SERPL-MCNC: 6.5 G/DL — SIGNIFICANT CHANGE UP (ref 6–8)
PROTHROM AB SERPL-ACNC: 25.7 SEC — HIGH (ref 9.95–12.87)
RBC # BLD: 3.28 M/UL — LOW (ref 4.2–5.4)
RBC # FLD: 13.5 % — SIGNIFICANT CHANGE UP (ref 11.5–14.5)
SODIUM SERPL-SCNC: 143 MMOL/L — SIGNIFICANT CHANGE UP (ref 135–146)
WBC # BLD: 5.9 K/UL — SIGNIFICANT CHANGE UP (ref 4.8–10.8)
WBC # FLD AUTO: 5.9 K/UL — SIGNIFICANT CHANGE UP (ref 4.8–10.8)

## 2020-10-10 PROCEDURE — 71045 X-RAY EXAM CHEST 1 VIEW: CPT | Mod: 26

## 2020-10-10 PROCEDURE — 73030 X-RAY EXAM OF SHOULDER: CPT | Mod: 26,RT

## 2020-10-10 PROCEDURE — 99285 EMERGENCY DEPT VISIT HI MDM: CPT

## 2020-10-10 PROCEDURE — 73060 X-RAY EXAM OF HUMERUS: CPT | Mod: 26,RT

## 2020-10-10 PROCEDURE — 72170 X-RAY EXAM OF PELVIS: CPT | Mod: 26

## 2020-10-10 RX ORDER — ACETAMINOPHEN 500 MG
650 TABLET ORAL ONCE
Refills: 0 | Status: COMPLETED | OUTPATIENT
Start: 2020-10-10 | End: 2020-10-10

## 2020-10-10 RX ORDER — SODIUM CHLORIDE 9 MG/ML
1000 INJECTION INTRAMUSCULAR; INTRAVENOUS; SUBCUTANEOUS ONCE
Refills: 0 | Status: COMPLETED | OUTPATIENT
Start: 2020-10-10 | End: 2020-10-10

## 2020-10-10 RX ADMIN — Medication 650 MILLIGRAM(S): at 23:08

## 2020-10-10 RX ADMIN — SODIUM CHLORIDE 1000 MILLILITER(S): 9 INJECTION INTRAMUSCULAR; INTRAVENOUS; SUBCUTANEOUS at 22:18

## 2020-10-10 NOTE — ED PROVIDER NOTE - ATTENDING CONTRIBUTION TO CARE
89y female above PMH on coumadin BIBEMS from [facility] s/p fall out of chair, has h/o same, at [place] baseline per daughter, on exam vital signs appreciated, awake, alert, head nc with bruise right temple, perrla, neck supple no ctls ttp, +kyphosis, cor reg lungs cta abd snt pelvis stable FROM with limited right shoulder flexion/abduction due to pain (able to internally/externally rotate, has chronic pain/deformity to same), neuro nonfocal, will check labs, imaging

## 2020-10-10 NOTE — ED ADULT TRIAGE NOTE - CHIEF COMPLAINT QUOTE
biba from [place], pt was sitting in chair and slid out, found on floor by staff, bump to right forehead, pt aox1 at baseline and hard of hearing.

## 2020-10-10 NOTE — ED PROVIDER NOTE - PATIENT PORTAL LINK FT
You can access the FollowMyHealth Patient Portal offered by [facility] by registering at the following website: [email or web address]. By joining [facility]’s FollowMyHealth portal, you will also be able to view your health information using other applications (apps) compatible with our system.

## 2020-10-10 NOTE — ED PROVIDER NOTE - NSFOLLOWUPINSTRUCTIONS_ED_ALL_ED_FT
During this hospitalization, you were found to have a pleural effusion, which means you have excess fluid buildup in the space between the layers of the pleura. The pleura are thin layers of tissue that form a 2-layered lining around the lungs. One layer of the pleura rests directly on the lungs. The other layer rests on the chest wall. There is normally a small amount of fluid called pleural fluid between these layers.     Do not smoke , and do not allow others to smoke around you. If you smoke, it is never too late to quit. Smoking increases your risk for lung infections such as pneumonia. Smoking also makes it harder for you to get better after you have a lung problem.    Deep breathing and coughing will decrease your risk for a lung infection. Take a deep breath and hold it for as long as you can. Let the air out and then cough strongly. Deep breaths help open your airway. You may be given an incentive spirometer to help you take deep breaths. Put the plastic piece in your mouth and take a slow, deep breath. Then let the air out and cough. Repeat these steps 10 times every hour.    If you experience coughing, holding a pillow over your chest when you cough may help decrease the pain.      Call your doctor if you are coughing up blood, have chest pain, shortness of breath or a fever of 100.4. If you had a thoracentesis to drain the fluid, seek medical attention for any signs of infection, including increased pain, redness, or swelling, warmth, or foul-smelling drainage.    Closed Head Injury    A closed head injury is an injury to your head that may or may not involve a traumatic brain injury (TBI). Symptoms of TBI can be short or long lasting and include headache, dizziness, interference with memory or speech, fatigue, confusion, changes in sleep, mood changes, nausea, depression/anxiety, and dulling of senses. Make sure to obtain proper rest which includes getting plenty of sleep, avoiding excessive visual stimulation, and avoiding activities that may cause physical or mental stress. Avoid any situation where there is potential for another head injury, including sports.    SEEK IMMEDIATE MEDICAL CARE IF YOU HAVE ANY OF THE FOLLOWING SYMPTOMS: unusual drowsiness, vomiting, severe dizziness, seizures, lightheadedness, muscular weakness, different pupil sizes, visual changes, or clear or bloody discharge from your ears or nose.

## 2020-10-10 NOTE — ED PROVIDER NOTE - CHILD ABUSE FACILITY
Routing refill request to provider for review/approval because:  Failed - Blood pressure under 140/90 in past 12 months         [facility]

## 2020-10-10 NOTE — ED ADULT NURSE NOTE - CHIEF COMPLAINT QUOTE
biba from [place], pt was sitting in chair and slid out, found on floor by staff, bump to right forehead, pt aox1 at baseline and hard of hearing.

## 2020-10-10 NOTE — ED PROVIDER NOTE - OBJECTIVE STATEMENT
89 year old female w hx of dementia, HTN, CKD, DM, a-fib on coumadin presents to the ED for evaluation of after fall. Pt was last seen this afternoon by NH staff at [place]. Found tonight on the floor on her right side, unsure if she got there however frequently falls out of her chair. Denies any complaints at this time, though noted to have swelling to right forehead.

## 2020-10-10 NOTE — ED PROVIDER NOTE - PROGRESS NOTE DETAILS
patient may receive IV contrast endorsed to Dr Hilliard Spoke with pt and daughter at bedside regarding results--aware of pleural effusions, pt is receiving lasix, daughter will inform treating MD of todays results. Return precautions provided. Pt/daughter agreeable to d/c back to [facility].

## 2020-10-10 NOTE — ED PROVIDER NOTE - CLINICAL SUMMARY MEDICAL DECISION MAKING FREE TEXT BOX
Chart reviewed. Labs noted. Pan CT negative except for bilateral pleural effusions (chronic as per family). Will D/C back to nursing home to follow up with PCP.

## 2020-10-10 NOTE — ED PROVIDER NOTE - PHYSICAL EXAMINATION
VITALS:  I have reviewed the initial vital signs.  GENERAL: Well-developed, well-nourished, in no acute distress.  HEENT: +small soft tissue hematoma to right forehead. No samaniego sign or raccoon eyes. Sclera clear. No hyphema. EOMI, PERRLA. No epistaxis or nasal septal hematoma. No facial bony ttp.  NECK: supple w FROM. No paraspinal muscle ttp. No midline cervical spinous tenderness, step offs, or deformity.  CARDIO: RRR, nl S1 and S2. No murmurs, rubs, or gallops. 2+ radial and pedal pulses bilaterally. No chest wall tenderness.  PULM: Normal effort. No tachypnea or retractions. No flail chest. CTA b/l without wheezes, rales, or rhonchi. Good air entry b/l.  MSK: +ttp along right lateral shoulder, otherwise FROM to extremities x4 w/o swelling/erythema/ecchymosis/deformity/ttp. No midline thoracic or lumbar spinous tenderness, step offs, or deformity. Pelvis stable.   GI: Abdomen soft and non-distended. Nontender throughout.  SKIN: Warm, dry. No ecchymosis, erythema, or wounds. Capillary refill <2 seconds.  NEURO: Alert, at baseline per daughter. Speech clear. No focal deficits.

## 2020-10-10 NOTE — ED PROVIDER NOTE - CARE PROVIDER_API CALL
Asya Gonzalez  [address] [address]  Phone: [phone]  Fax: [phone]  Follow Up Time: 1-3 Days

## 2020-10-10 NOTE — ED ADULT NURSE NOTE - PMH
Atrial fibrillation    CHF (congestive heart failure)    COPD (chronic obstructive pulmonary disease)    GERD (gastroesophageal reflux disease)    Glaucoma    HTN (hypertension)    Pneumonia

## 2020-10-10 NOTE — ED PROVIDER NOTE - CARE PLAN
Principal Discharge DX:	Fall at nursing home, initial encounter  Secondary Diagnosis:	Closed head injury, initial encounter  Secondary Diagnosis:	Pleural effusion

## 2020-10-11 PROCEDURE — 72125 CT NECK SPINE W/O DYE: CPT | Mod: 26

## 2020-10-11 PROCEDURE — 74177 CT ABD & PELVIS W/CONTRAST: CPT | Mod: 26

## 2020-10-11 PROCEDURE — 71260 CT THORAX DX C+: CPT | Mod: 26

## 2020-10-11 PROCEDURE — 70450 CT HEAD/BRAIN W/O DYE: CPT | Mod: 26
